# Patient Record
Sex: MALE | Race: WHITE | Employment: FULL TIME | URBAN - METROPOLITAN AREA
[De-identification: names, ages, dates, MRNs, and addresses within clinical notes are randomized per-mention and may not be internally consistent; named-entity substitution may affect disease eponyms.]

---

## 2017-03-30 ENCOUNTER — HOSPITAL ENCOUNTER (OUTPATIENT)
Dept: PREADMISSION TESTING | Age: 57
Discharge: HOME | End: 2017-03-30
Payer: COMMERCIAL

## 2017-03-30 VITALS
HEIGHT: 69 IN | BODY MASS INDEX: 27.99 KG/M2 | SYSTOLIC BLOOD PRESSURE: 127 MMHG | WEIGHT: 189 LBS | HEART RATE: 54 BPM | DIASTOLIC BLOOD PRESSURE: 71 MMHG | TEMPERATURE: 97.5 F

## 2017-03-30 LAB
ALBUMIN SERPL BCP-MCNC: 3.6 G/DL (ref 3.5–5)
ALBUMIN/GLOB SERPL: 1.1 {RATIO} (ref 1.1–2.2)
ALP SERPL-CCNC: 54 U/L (ref 45–117)
ALT SERPL-CCNC: 33 U/L (ref 12–78)
ANION GAP BLD CALC-SCNC: 10 MMOL/L (ref 5–15)
APPEARANCE UR: CLEAR
APTT PPP: 27.1 SEC (ref 22.1–32.5)
AST SERPL W P-5'-P-CCNC: 12 U/L (ref 15–37)
BACTERIA URNS QL MICRO: NEGATIVE /HPF
BASOPHILS # BLD AUTO: 0 K/UL (ref 0–0.1)
BASOPHILS # BLD: 1 % (ref 0–1)
BILIRUB SERPL-MCNC: 0.5 MG/DL (ref 0.2–1)
BILIRUB UR QL: NEGATIVE
BUN SERPL-MCNC: 16 MG/DL (ref 6–20)
BUN/CREAT SERPL: 14 (ref 12–20)
CALCIUM SERPL-MCNC: 9.3 MG/DL (ref 8.5–10.1)
CHLORIDE SERPL-SCNC: 103 MMOL/L (ref 97–108)
CO2 SERPL-SCNC: 27 MMOL/L (ref 21–32)
COLOR UR: ABNORMAL
CREAT SERPL-MCNC: 1.14 MG/DL (ref 0.7–1.3)
EOSINOPHIL # BLD: 0.2 K/UL (ref 0–0.4)
EOSINOPHIL NFR BLD: 2 % (ref 0–7)
EPITH CASTS URNS QL MICRO: ABNORMAL /LPF
ERYTHROCYTE [DISTWIDTH] IN BLOOD BY AUTOMATED COUNT: 13.4 % (ref 11.5–14.5)
GLOBULIN SER CALC-MCNC: 3.4 G/DL (ref 2–4)
GLUCOSE SERPL-MCNC: 129 MG/DL (ref 65–100)
GLUCOSE UR STRIP.AUTO-MCNC: >1000 MG/DL
HCT VFR BLD AUTO: 43.8 % (ref 36.6–50.3)
HGB BLD-MCNC: 14.9 G/DL (ref 12.1–17)
HGB UR QL STRIP: NEGATIVE
HYALINE CASTS URNS QL MICRO: ABNORMAL /LPF (ref 0–5)
INR PPP: 1 (ref 0.9–1.1)
KETONES UR QL STRIP.AUTO: NEGATIVE MG/DL
LEUKOCYTE ESTERASE UR QL STRIP.AUTO: NEGATIVE
LYMPHOCYTES # BLD AUTO: 30 % (ref 12–49)
LYMPHOCYTES # BLD: 1.9 K/UL (ref 0.8–3.5)
MCH RBC QN AUTO: 28.7 PG (ref 26–34)
MCHC RBC AUTO-ENTMCNC: 34 G/DL (ref 30–36.5)
MCV RBC AUTO: 84.4 FL (ref 80–99)
MONOCYTES # BLD: 0.5 K/UL (ref 0–1)
MONOCYTES NFR BLD AUTO: 8 % (ref 5–13)
NEUTS SEG # BLD: 3.8 K/UL (ref 1.8–8)
NEUTS SEG NFR BLD AUTO: 59 % (ref 32–75)
NITRITE UR QL STRIP.AUTO: NEGATIVE
PH UR STRIP: 5.5 [PH] (ref 5–8)
PLATELET # BLD AUTO: 239 K/UL (ref 150–400)
POTASSIUM SERPL-SCNC: 4.5 MMOL/L (ref 3.5–5.1)
PROT SERPL-MCNC: 7 G/DL (ref 6.4–8.2)
PROT UR STRIP-MCNC: NEGATIVE MG/DL
PROTHROMBIN TIME: 9.9 SEC (ref 9–11.1)
RBC # BLD AUTO: 5.19 M/UL (ref 4.1–5.7)
RBC #/AREA URNS HPF: ABNORMAL /HPF (ref 0–5)
SODIUM SERPL-SCNC: 140 MMOL/L (ref 136–145)
SP GR UR REFRACTOMETRY: 1.01 (ref 1–1.03)
THERAPEUTIC RANGE,PTTT: NORMAL SECS (ref 58–77)
UROBILINOGEN UR QL STRIP.AUTO: 0.2 EU/DL (ref 0.2–1)
WBC # BLD AUTO: 6.4 K/UL (ref 4.1–11.1)
WBC URNS QL MICRO: ABNORMAL /HPF (ref 0–4)

## 2017-03-30 PROCEDURE — 36415 COLL VENOUS BLD VENIPUNCTURE: CPT | Performed by: UROLOGY

## 2017-03-30 PROCEDURE — 85610 PROTHROMBIN TIME: CPT | Performed by: UROLOGY

## 2017-03-30 PROCEDURE — 87086 URINE CULTURE/COLONY COUNT: CPT | Performed by: UROLOGY

## 2017-03-30 PROCEDURE — 85025 COMPLETE CBC W/AUTO DIFF WBC: CPT | Performed by: UROLOGY

## 2017-03-30 PROCEDURE — 80053 COMPREHEN METABOLIC PANEL: CPT | Performed by: UROLOGY

## 2017-03-30 PROCEDURE — 81001 URINALYSIS AUTO W/SCOPE: CPT | Performed by: UROLOGY

## 2017-03-30 PROCEDURE — 85730 THROMBOPLASTIN TIME PARTIAL: CPT | Performed by: UROLOGY

## 2017-03-30 PROCEDURE — 93005 ELECTROCARDIOGRAM TRACING: CPT

## 2017-03-30 RX ORDER — DICLOFENAC SODIUM 75 MG/1
TABLET, DELAYED RELEASE ORAL
COMMUNITY

## 2017-03-30 NOTE — PERIOP NOTES
PATIENT  GIVEN PREOPERATIVE INSTRUCTION SHEET AND SURGICAL SITE INFECTION FAQS SHEET. PATIENT ALSO GIVEN CHG WIPES WITH WRITTEN INSTRUCTIONS FOR USE WHICH WERE REVIEWED WITH PATIENT.

## 2017-03-31 LAB
ATRIAL RATE: 53 BPM
CALCULATED P AXIS, ECG09: 11 DEGREES
CALCULATED R AXIS, ECG10: -50 DEGREES
CALCULATED T AXIS, ECG11: 19 DEGREES
DIAGNOSIS, 93000: NORMAL
P-R INTERVAL, ECG05: 132 MS
Q-T INTERVAL, ECG07: 402 MS
QRS DURATION, ECG06: 96 MS
QTC CALCULATION (BEZET), ECG08: 377 MS
VENTRICULAR RATE, ECG03: 53 BPM

## 2017-04-01 LAB
BACTERIA SPEC CULT: NORMAL
CC UR VC: NORMAL
SERVICE CMNT-IMP: NORMAL

## 2017-04-05 ENCOUNTER — ANESTHESIA EVENT (OUTPATIENT)
Dept: SURGERY | Age: 57
End: 2017-04-05
Payer: COMMERCIAL

## 2017-04-06 ENCOUNTER — HOSPITAL ENCOUNTER (OUTPATIENT)
Age: 57
Setting detail: OUTPATIENT SURGERY
Discharge: HOME | End: 2017-04-06
Attending: UROLOGY | Admitting: UROLOGY
Payer: COMMERCIAL

## 2017-04-06 ENCOUNTER — ANESTHESIA (OUTPATIENT)
Dept: SURGERY | Age: 57
End: 2017-04-06
Payer: COMMERCIAL

## 2017-04-06 VITALS
RESPIRATION RATE: 15 BRPM | DIASTOLIC BLOOD PRESSURE: 77 MMHG | WEIGHT: 189 LBS | HEART RATE: 61 BPM | HEIGHT: 69 IN | OXYGEN SATURATION: 97 % | BODY MASS INDEX: 27.99 KG/M2 | SYSTOLIC BLOOD PRESSURE: 128 MMHG | TEMPERATURE: 98.4 F

## 2017-04-06 LAB
GLUCOSE BLD STRIP.AUTO-MCNC: 133 MG/DL (ref 65–100)
GLUCOSE BLD STRIP.AUTO-MCNC: 168 MG/DL (ref 65–100)
SERVICE CMNT-IMP: ABNORMAL
SERVICE CMNT-IMP: ABNORMAL

## 2017-04-06 PROCEDURE — 74011000250 HC RX REV CODE- 250: Performed by: UROLOGY

## 2017-04-06 PROCEDURE — 77030002933 HC SUT MCRYL J&J -A: Performed by: UROLOGY

## 2017-04-06 PROCEDURE — 76010000153 HC OR TIME 1.5 TO 2 HR: Performed by: UROLOGY

## 2017-04-06 PROCEDURE — 74011000272 HC RX REV CODE- 272: Performed by: UROLOGY

## 2017-04-06 PROCEDURE — 74011250636 HC RX REV CODE- 250/636: Performed by: ANESTHESIOLOGY

## 2017-04-06 PROCEDURE — 77030002888 HC SUT CHRMC J&J -A: Performed by: UROLOGY

## 2017-04-06 PROCEDURE — 76060000034 HC ANESTHESIA 1.5 TO 2 HR: Performed by: UROLOGY

## 2017-04-06 PROCEDURE — 77030008684 HC TU ET CUF COVD -B: Performed by: ANESTHESIOLOGY

## 2017-04-06 PROCEDURE — 77030034849: Performed by: UROLOGY

## 2017-04-06 PROCEDURE — 77030005529 HC CATH URETH FOL40 BARD -A: Performed by: UROLOGY

## 2017-04-06 PROCEDURE — 74011250636 HC RX REV CODE- 250/636: Performed by: UROLOGY

## 2017-04-06 PROCEDURE — 77030031139 HC SUT VCRL2 J&J -A: Performed by: UROLOGY

## 2017-04-06 PROCEDURE — 77030010545: Performed by: UROLOGY

## 2017-04-06 PROCEDURE — 74011250636 HC RX REV CODE- 250/636

## 2017-04-06 PROCEDURE — 77030020782 HC GWN BAIR PAWS FLX 3M -B

## 2017-04-06 PROCEDURE — 76210000006 HC OR PH I REC 0.5 TO 1 HR: Performed by: UROLOGY

## 2017-04-06 PROCEDURE — 77030020256 HC SOL INJ NACL 0.9%  500ML: Performed by: UROLOGY

## 2017-04-06 PROCEDURE — 77030018846 HC SOL IRR STRL H20 ICUM -A: Performed by: UROLOGY

## 2017-04-06 PROCEDURE — 77030013079 HC BLNKT BAIR HGGR 3M -A: Performed by: ANESTHESIOLOGY

## 2017-04-06 PROCEDURE — 74011000250 HC RX REV CODE- 250

## 2017-04-06 PROCEDURE — 76210000021 HC REC RM PH II 0.5 TO 1 HR: Performed by: UROLOGY

## 2017-04-06 PROCEDURE — 77030026438 HC STYL ET INTUB CARD -A: Performed by: ANESTHESIOLOGY

## 2017-04-06 PROCEDURE — 77030032490 HC SLV COMPR SCD KNE COVD -B: Performed by: UROLOGY

## 2017-04-06 PROCEDURE — 77030011640 HC PAD GRND REM COVD -A: Performed by: UROLOGY

## 2017-04-06 PROCEDURE — 77030010507 HC ADH SKN DERMBND J&J -B: Performed by: UROLOGY

## 2017-04-06 PROCEDURE — 77030018836 HC SOL IRR NACL ICUM -A: Performed by: UROLOGY

## 2017-04-06 PROCEDURE — 77030012407 HC DRN WND BARD -B: Performed by: UROLOGY

## 2017-04-06 PROCEDURE — 77030010011 HC RNG RETRCTR STAY COOP -B: Performed by: UROLOGY

## 2017-04-06 PROCEDURE — 77030002986 HC SUT PROL J&J -A: Performed by: UROLOGY

## 2017-04-06 PROCEDURE — 82962 GLUCOSE BLOOD TEST: CPT

## 2017-04-06 PROCEDURE — 74011000258 HC RX REV CODE- 258: Performed by: UROLOGY

## 2017-04-06 RX ORDER — MIDAZOLAM HYDROCHLORIDE 1 MG/ML
INJECTION, SOLUTION INTRAMUSCULAR; INTRAVENOUS AS NEEDED
Status: DISCONTINUED | OUTPATIENT
Start: 2017-04-06 | End: 2017-04-06 | Stop reason: HOSPADM

## 2017-04-06 RX ORDER — VANCOMYCIN 1.75 GRAM/500 ML IN 0.9 % SODIUM CHLORIDE INTRAVENOUS
1750
Status: COMPLETED | OUTPATIENT
Start: 2017-04-06 | End: 2017-04-06

## 2017-04-06 RX ORDER — BUPIVACAINE HYDROCHLORIDE 5 MG/ML
INJECTION, SOLUTION EPIDURAL; INTRACAUDAL AS NEEDED
Status: DISCONTINUED | OUTPATIENT
Start: 2017-04-06 | End: 2017-04-06 | Stop reason: HOSPADM

## 2017-04-06 RX ORDER — HYDROCODONE BITARTRATE AND ACETAMINOPHEN 7.5; 325 MG/1; MG/1
TABLET ORAL
Qty: 25 TAB | Refills: 0 | Status: SHIPPED | OUTPATIENT
Start: 2017-04-06

## 2017-04-06 RX ORDER — GENTAMICIN SULFATE 80 MG/100ML
80 INJECTION, SOLUTION INTRAVENOUS
Status: DISCONTINUED | OUTPATIENT
Start: 2017-04-06 | End: 2017-04-06 | Stop reason: SDUPTHER

## 2017-04-06 RX ORDER — SODIUM CHLORIDE, SODIUM LACTATE, POTASSIUM CHLORIDE, CALCIUM CHLORIDE 600; 310; 30; 20 MG/100ML; MG/100ML; MG/100ML; MG/100ML
INJECTION, SOLUTION INTRAVENOUS
Status: DISCONTINUED | OUTPATIENT
Start: 2017-04-06 | End: 2017-04-06 | Stop reason: HOSPADM

## 2017-04-06 RX ORDER — SODIUM CHLORIDE 0.9 % (FLUSH) 0.9 %
5-10 SYRINGE (ML) INJECTION EVERY 8 HOURS
Status: DISCONTINUED | OUTPATIENT
Start: 2017-04-06 | End: 2017-04-06 | Stop reason: HOSPADM

## 2017-04-06 RX ORDER — PROPOFOL 10 MG/ML
INJECTION, EMULSION INTRAVENOUS AS NEEDED
Status: DISCONTINUED | OUTPATIENT
Start: 2017-04-06 | End: 2017-04-06 | Stop reason: HOSPADM

## 2017-04-06 RX ORDER — SODIUM CHLORIDE, SODIUM LACTATE, POTASSIUM CHLORIDE, CALCIUM CHLORIDE 600; 310; 30; 20 MG/100ML; MG/100ML; MG/100ML; MG/100ML
50 INJECTION, SOLUTION INTRAVENOUS CONTINUOUS
Status: DISCONTINUED | OUTPATIENT
Start: 2017-04-06 | End: 2017-04-06 | Stop reason: HOSPADM

## 2017-04-06 RX ORDER — SODIUM CHLORIDE 0.9 % (FLUSH) 0.9 %
5-10 SYRINGE (ML) INJECTION AS NEEDED
Status: DISCONTINUED | OUTPATIENT
Start: 2017-04-06 | End: 2017-04-06 | Stop reason: HOSPADM

## 2017-04-06 RX ORDER — ROCURONIUM BROMIDE 10 MG/ML
INJECTION, SOLUTION INTRAVENOUS AS NEEDED
Status: DISCONTINUED | OUTPATIENT
Start: 2017-04-06 | End: 2017-04-06 | Stop reason: HOSPADM

## 2017-04-06 RX ORDER — MORPHINE SULFATE 10 MG/ML
2 INJECTION, SOLUTION INTRAMUSCULAR; INTRAVENOUS
Status: DISCONTINUED | OUTPATIENT
Start: 2017-04-06 | End: 2017-04-06 | Stop reason: HOSPADM

## 2017-04-06 RX ORDER — LIDOCAINE HYDROCHLORIDE 20 MG/ML
INJECTION, SOLUTION EPIDURAL; INFILTRATION; INTRACAUDAL; PERINEURAL AS NEEDED
Status: DISCONTINUED | OUTPATIENT
Start: 2017-04-06 | End: 2017-04-06 | Stop reason: HOSPADM

## 2017-04-06 RX ORDER — ONDANSETRON 2 MG/ML
4 INJECTION INTRAMUSCULAR; INTRAVENOUS AS NEEDED
Status: DISCONTINUED | OUTPATIENT
Start: 2017-04-06 | End: 2017-04-06 | Stop reason: HOSPADM

## 2017-04-06 RX ORDER — SULFAMETHOXAZOLE AND TRIMETHOPRIM 800; 160 MG/1; MG/1
1 TABLET ORAL 2 TIMES DAILY
Qty: 14 TAB | Refills: 0 | Status: SHIPPED | OUTPATIENT
Start: 2017-04-06 | End: 2017-04-13

## 2017-04-06 RX ORDER — LIDOCAINE HYDROCHLORIDE 10 MG/ML
0.1 INJECTION, SOLUTION EPIDURAL; INFILTRATION; INTRACAUDAL; PERINEURAL AS NEEDED
Status: DISCONTINUED | OUTPATIENT
Start: 2017-04-06 | End: 2017-04-06 | Stop reason: HOSPADM

## 2017-04-06 RX ORDER — GLYCOPYRROLATE 0.2 MG/ML
INJECTION INTRAMUSCULAR; INTRAVENOUS AS NEEDED
Status: DISCONTINUED | OUTPATIENT
Start: 2017-04-06 | End: 2017-04-06

## 2017-04-06 RX ORDER — FENTANYL CITRATE 50 UG/ML
25 INJECTION, SOLUTION INTRAMUSCULAR; INTRAVENOUS
Status: DISCONTINUED | OUTPATIENT
Start: 2017-04-06 | End: 2017-04-06 | Stop reason: HOSPADM

## 2017-04-06 RX ORDER — HYDROMORPHONE HYDROCHLORIDE 1 MG/ML
0.2 INJECTION, SOLUTION INTRAMUSCULAR; INTRAVENOUS; SUBCUTANEOUS
Status: DISCONTINUED | OUTPATIENT
Start: 2017-04-06 | End: 2017-04-06 | Stop reason: HOSPADM

## 2017-04-06 RX ORDER — SUCCINYLCHOLINE CHLORIDE 20 MG/ML
INJECTION INTRAMUSCULAR; INTRAVENOUS AS NEEDED
Status: DISCONTINUED | OUTPATIENT
Start: 2017-04-06 | End: 2017-04-06 | Stop reason: HOSPADM

## 2017-04-06 RX ORDER — ONDANSETRON 2 MG/ML
INJECTION INTRAMUSCULAR; INTRAVENOUS AS NEEDED
Status: DISCONTINUED | OUTPATIENT
Start: 2017-04-06 | End: 2017-04-06 | Stop reason: HOSPADM

## 2017-04-06 RX ORDER — FENTANYL CITRATE 50 UG/ML
INJECTION, SOLUTION INTRAMUSCULAR; INTRAVENOUS AS NEEDED
Status: DISCONTINUED | OUTPATIENT
Start: 2017-04-06 | End: 2017-04-06 | Stop reason: HOSPADM

## 2017-04-06 RX ADMIN — PROPOFOL 170 MG: 10 INJECTION, EMULSION INTRAVENOUS at 10:15

## 2017-04-06 RX ADMIN — FENTANYL CITRATE 50 MCG: 50 INJECTION, SOLUTION INTRAMUSCULAR; INTRAVENOUS at 10:45

## 2017-04-06 RX ADMIN — GENTAMICIN SULFATE 380 MG: 40 INJECTION, SOLUTION INTRAMUSCULAR; INTRAVENOUS at 10:08

## 2017-04-06 RX ADMIN — ROCURONIUM BROMIDE 5 MG: 10 INJECTION, SOLUTION INTRAVENOUS at 10:15

## 2017-04-06 RX ADMIN — SUCCINYLCHOLINE CHLORIDE 180 MG: 20 INJECTION INTRAMUSCULAR; INTRAVENOUS at 10:15

## 2017-04-06 RX ADMIN — ROCURONIUM BROMIDE 25 MG: 10 INJECTION, SOLUTION INTRAVENOUS at 10:25

## 2017-04-06 RX ADMIN — SODIUM CHLORIDE, SODIUM LACTATE, POTASSIUM CHLORIDE, CALCIUM CHLORIDE: 600; 310; 30; 20 INJECTION, SOLUTION INTRAVENOUS at 10:00

## 2017-04-06 RX ADMIN — VANCOMYCIN HYDROCHLORIDE 1750 MG: 10 INJECTION, POWDER, LYOPHILIZED, FOR SOLUTION INTRAVENOUS at 09:36

## 2017-04-06 RX ADMIN — LIDOCAINE HYDROCHLORIDE 80 MG: 20 INJECTION, SOLUTION EPIDURAL; INFILTRATION; INTRACAUDAL; PERINEURAL at 10:15

## 2017-04-06 RX ADMIN — ONDANSETRON 4 MG: 2 INJECTION INTRAMUSCULAR; INTRAVENOUS at 10:08

## 2017-04-06 RX ADMIN — FENTANYL CITRATE 50 MCG: 50 INJECTION, SOLUTION INTRAMUSCULAR; INTRAVENOUS at 10:25

## 2017-04-06 RX ADMIN — FENTANYL CITRATE 50 MCG: 50 INJECTION, SOLUTION INTRAMUSCULAR; INTRAVENOUS at 10:05

## 2017-04-06 RX ADMIN — SODIUM CHLORIDE, SODIUM LACTATE, POTASSIUM CHLORIDE, AND CALCIUM CHLORIDE 50 ML/HR: 600; 310; 30; 20 INJECTION, SOLUTION INTRAVENOUS at 09:31

## 2017-04-06 RX ADMIN — MIDAZOLAM HYDROCHLORIDE 2 MG: 1 INJECTION, SOLUTION INTRAMUSCULAR; INTRAVENOUS at 10:05

## 2017-04-06 RX ADMIN — FENTANYL CITRATE 100 MCG: 50 INJECTION, SOLUTION INTRAMUSCULAR; INTRAVENOUS at 10:15

## 2017-04-06 NOTE — ANESTHESIA PREPROCEDURE EVALUATION
Anesthetic History   No history of anesthetic complications            Review of Systems / Medical History  Patient summary reviewed, nursing notes reviewed and pertinent labs reviewed    Pulmonary  Within defined limits                 Neuro/Psych              Cardiovascular                  Exercise tolerance: >4 METS     GI/Hepatic/Renal  Within defined limits              Endo/Other  Within defined limits  Diabetes: well controlled, type 2         Other Findings              Physical Exam    Airway  Mallampati: II  TM Distance: > 6 cm  Neck ROM: normal range of motion   Mouth opening: Normal     Cardiovascular    Rhythm: regular  Rate: normal         Dental  No notable dental hx       Pulmonary  Breath sounds clear to auscultation               Abdominal         Other Findings            Anesthetic Plan    ASA: 2  Anesthesia type: general          Induction: Intravenous  Anesthetic plan and risks discussed with: Patient

## 2017-04-06 NOTE — IP AVS SNAPSHOT
[address] [place] [address] 
[phone] Patient: Sergei Chavez MRN: [id number] RPB:4[birth year]7/[birth year]17 You are allergic to the following No active allergies Recent Documentation Height Weight BMI Smoking Status 1.753 m 85.7 kg 27.91 kg/m2 Never Smoker Unresulted Labs Order Current Status SAMPLE TO BLOOD BANK In process Emergency Contacts Name Discharge Info Relation Home Work Mobile [employer] SOHA DE LA O [facility] DISCHARGE CAREGIVER [3] Spouse [3] [phone] About your hospitalization You were admitted on:  April 6, 2017 You last received care in the:  [facility] PACU You were discharged on:  April 6, 2017 Unit phone number:  [phone] Why you were hospitalized Your primary diagnosis was:  Not on File Providers Seen During Your Hospitalizations Provider Role Specialty Primary office phone Catracho Cohen MD Attending Provider Urology [phone] Your Primary Care Physician (PCP) Primary Care Physician Office Phone Office Fax Aminata Grimm [phone] Follow-up Information Follow up With Details Comments Contact Info Estela Almeida MD   [facility] [address] [facility] [place] [address] 
[phone] Catracho Cohen MD Schedule an appointment as soon as possible for a visit in 10 day(s)  [facility] [address] [place] [address] 
[phone] Current Discharge Medication List  
  
START taking these medications Dose & Instructions Dispensing Information Comments Morning Noon Evening Bedtime HYDROcodone-acetaminophen 7.5-325 mg per tablet Commonly known as:  Vickie Ponce Your last dose was: Your next dose is:    
   
   
 1-2 tab PO C3oxehi PRN Pain Quantity:  25 Tab Refills:  0  
     
   
   
   
  
 trimethoprim-sulfamethoxazole 160-800 mg per tablet Commonly known as:  BACTRIM DS, SEPTRA DS Your last dose was: Your next dose is:    
   
   
 Dose:  1 Tab Take 1 Tab by mouth two (2) times a day for 7 days. Quantity:  14 Tab Refills:  0 CONTINUE these medications which have NOT CHANGED Dose & Instructions Dispensing Information Comments Morning Noon Evening Bedtime  
 diclofenac EC 75 mg EC tablet Commonly known as:  VOLTAREN Your last dose was: Your next dose is: Take  by mouth. Refills:  0 INVOKANA PO Your last dose was: Your next dose is:    
   
   
 Dose:  100 mg Take 100 mg by mouth daily. Refills:  0 KOMBIGLYZE XR PO Your last dose was: Your next dose is: Take  by mouth two (2) times a day. 2.5/1000MG Refills:  0 LIPITOR 20 mg tablet Generic drug:  atorvastatin Your last dose was: Your next dose is:    
   
   
 Dose:  20 mg Take 20 mg by mouth daily. Refills:  0  
     
   
   
   
  
 RAMIPRIL PO Your last dose was: Your next dose is:    
   
   
 Dose:  2.5 mg Take 2.5 mg by mouth daily. Refills:  0  
     
   
   
   
  
 TOUJEO SOLOSTAR SC Your last dose was: Your next dose is:    
   
   
 Dose:  57 Units 57 Units by SubCUTAneous route. 300/ UNITS/ ML Refills:  0 Where to Get Your Medications Information on where to get these meds will be given to you by the nurse or doctor. ! Ask your nurse or doctor about these medications HYDROcodone-acetaminophen 7.5-325 mg per tablet  
 trimethoprim-sulfamethoxazole 160-800 mg per tablet Discharge Instructions Post-Operative Instructions for Penile Prosthesis Diet There are no diet restrictions.   Recommend balanced meals that include lean meats, green and yellow vegetables, citrus fruits, dairy products and whole wheat bread and grains as these promote tissue healing. Medications Normally, you are sent home with two prescriptions. Be sure to get these filled promptly after your discharge from the hospital/surgery center. One is for an antibiotic. Take all of the antibiotics as prescribed. Call Dr. Kymberly Rubio if side effects arise. The other is for pain medication. Take when needed, according to prescription instructions. We recommend using Tylenol (acetaminophen) for pain first and use narcotic pain medication only as needed. A laxative is suggested to prevent constipation. Colace over-the-counter is effective. One tablet twice a day while using the narcotic prescription. Call our office if diarrhea or loose stools occur. All regularly prescribed medications may be resumed upon discharge, except blood thinners (ex: Coumadin, aspirin, ibuprofen). If you take blood thinner medication and were not advised about resuming this drug at discharge, please contact a nurse or physician before resuming. Wound Care Keep your incisions clean and dry. If your incision is on the underside of your penis, you should keep your penis up against your abdomen, held there by support underwear. Initially use ice or cold pack to prevent/treat swelling. No more than 20minutes at a time. Use a barrier (sheet/towel) to prevent tissue injury. Briefs or bicycle/exercise tights can help stabilize the area and decrease swelling. Contact us if: You notice excessive swelling or redness, red streaks, drainage from the incision, or extreme tenderness. You are unable to urinate, or if your temperature reaches 101.5F. You are having pain that is not being relieved by the medication prescribed for you. Swelling or warmth limited to one leg or arm.  
Call the office if between 8:00am and 4:30pm.  Call overnight line at [phone] from 4:30pm through 8:00am. 
 
Activity Refrain from driving for 3-5 days or while using narcotics. No heavy lifting (>15lbs) or strenuous exercise for 2 weeks. You may return to work at your discretion, usually after 1 to 2 weeks. No sexual activity until cleared by Dr. Duc Carlson. He will give you teaching on usage of the device during followup. Bathing Warm soaks may help for pain relief and tissue healing. They may be started 72 hours after surgery, two times daily for 15 to 20 minutes. You can shower in 48 hours but do not wash the wound site with soap. No baths or swimming for 2 weeks. [facility]  Contact Information  Daytime 8AM  4:30PM 
[employer] 
[phone] [place] 
[phone] After hours  [phone] DISCHARGE SUMMARY from Nurse The following personal items are in your possession at time of discharge: 
 
Dental Appliances: None Visual Aid: None Home Medications: None Jewelry: None Clothing: Other (comment) Other Valuables: None PATIENT INSTRUCTIONS: 
 
 
F-face looks uneven A-arms unable to move or move unevenly S-speech slurred or non-existent T-time-call 911 as soon as signs and symptoms begin-DO NOT go Back to bed or wait to see if you get better-TIME IS BRAIN. Warning Signs of HEART ATTACK Call 911 if you have these symptoms: 
? Chest discomfort. Most heart attacks involve discomfort in the center of the chest that lasts more than a few minutes, or that goes away and comes back. It can feel like uncomfortable pressure, squeezing, fullness, or pain. ? Discomfort in other areas of the upper body.  Symptoms can include pain or discomfort in one or both arms, the back, neck, jaw, or stomach. ? Shortness of breath with or without chest discomfort. ? Other signs may include breaking out in a cold sweat, nausea, or lightheadedness. Don't wait more than five minutes to call [address]! Fast action can save your life. Calling 911 is almost always the fastest way to get lifesaving treatment. Emergency Medical Services staff can begin treatment when they arrive  up to an hour sooner than if someone gets to the hospital by car. The discharge information has been reviewed with the patient & spouse. The patient & spouse verbalized understanding. Discharge medications reviewed with the patient and spouse and appropriate educational materials and side effects teaching were provided. Discharge Orders None Introducing [facility]! Vicki June introduces [facility] patient portal. Now you can access parts of your medical record, email your doctor's office, and request medication refills online. 1. In your internet browser, go to [email or web address] 2. Click on the First Time User? Click Here link in the Sign In box. You will see the New Member Sign Up page. 3. Enter your [facility] Access Code exactly as it appears below. You will not need to use this code after youve completed the sign-up process. If you do not sign up before the expiration date, you must request a new code. · [facility] Access Code: [email or web address] Expires: 6/26/2017  4:09 PM 
 
4. Enter the last four digits of your Social Security Number (xxxx) and Date of Birth (mm/dd/yyyy) as indicated and click Submit. You will be taken to the next sign-up page. 5. Create a [employer] ID. This will be your [facility] login ID and cannot be changed, so think of one that is secure and easy to remember. 6. Create a [employer] password. You can change your password at any time. 7. Enter your Password Reset Question and Answer. This can be used at a later time if you forget your password. 8. Enter your e-mail address. You will receive e-mail notification when new information is available in [address] 9. Click Sign Up. You can now view and download portions of your medical record. 10. Click the Download Summary menu link to download a portable copy of your medical information. If you have questions, please visit the Frequently Asked Questions section of the [employer] website. Remember, [employer] is NOT to be used for urgent needs. For medical emergencies, dial 911. Now available from your iPhone and Android! General Information Please provide this summary of care documentation to your next provider. Patient Signature:  ____________________________________________________________ Date:  ____________________________________________________________  
  
Berenice Virgen Provider Signature:  ____________________________________________________________ Date:  ____________________________________________________________

## 2017-04-06 NOTE — ANESTHESIA POSTPROCEDURE EVALUATION
Post-Anesthesia Evaluation and Assessment    Patient: Kishan Cosme MRN: [id number]  SSN: [id number]    YOB: 1960  Age: 65 y.o. Sex: male       Cardiovascular Function/Vital Signs  Visit Vitals    /77    Pulse 61    Temp 36.9 °C (98.4 °F)    Resp 15    Ht 5' 9\" (1.753 m)    Wt 85.7 kg (189 lb)    SpO2 97%    BMI 27.91 kg/m2       Patient is status post general anesthesia for Procedure(s):  REVISION  PENILE PROSTHESIS. Nausea/Vomiting: None    Postoperative hydration reviewed and adequate. Pain:  Pain Scale 1: Numeric (0 - 10) (04/06/17 1246)  Pain Intensity 1: 3 (04/06/17 1246)   Managed    Neurological Status:   Neuro (WDL): Exceptions to WDL (04/06/17 1246)  Neuro  Neurologic State: Alert;Drowsy (04/06/17 1246)  Orientation Level: Oriented X4 (04/06/17 1246)  Cognition: Follows commands (04/06/17 1246)  Speech: Clear (04/06/17 1246)  LUE Motor Response: Purposeful (04/06/17 1246)  LLE Motor Response: Purposeful (04/06/17 1246)  RUE Motor Response: Purposeful (04/06/17 1246)  RLE Motor Response: Purposeful (04/06/17 1246)   At baseline    Mental Status and Level of Consciousness: Arousable    Pulmonary Status:   O2 Device: Room air (04/06/17 1246)   Adequate oxygenation and airway patent    Complications related to anesthesia: None    Post-anesthesia assessment completed.  No concerns    Signed By: Shantal Child MD     April 6, 2017

## 2017-04-06 NOTE — H&P
Sarah Trejo is a 55year old male who presents today for \"about the same\". He returns for follow-up. Established patient who underwent three-piece IPP placement and vasectomy mid November 2016. Exploration and evaluation of left testicle as well as left nerve denervation performed December 2016. Update: The patient is doing a good bit better with the left testicular pain after denervation. He still has persistent adherence of the pump with difficulty. Denies fevers, chills, nausea, vomiting, or other signs of acute infection. PAST MEDICAL HISTORY:    Allergies: No known allergies. DENIES: Latex, Shellfish, X-Ray Dye, Iodine. Medications: TOUJEO SOLOSTAR 300 UNIT/ML SC SOPN (INSULIN GLARGINE)   KOMBIGLYZE XR 2.5-1000 MG XR24H-TAB (SAXAGLIPTIN-METFORMIN) daily  ATORVASTATIN CALCIUM 20 MG TABS (ATORVASTATIN CALCIUM) daily  RAMIPRIL 2.5 MG CAPS (RAMIPRIL) daily  INVOKANA 100 MG ORAL TABS (CANAGLIFLOZIN) daily    Problems: Pain in left testicle (ICD-608.9) (FNA38-F56.812)  Wound check (ICD-879.8) (HBG43-W79.89)  ELECTIVE STERILIZATION (ICD-V25.2) (MTL11-M02.2)  Erectile dysfunction, organic (ICD-607.84) (MSW52-B32.9)  ELECTIVE STERILIZATION (ICD-V25.2) (MDD61-M98.2)    Illnesses: Diabetes. DENIES: Heart Disease, Pacemaker/Defibrillator, Lung Disease, High Blood Pressure, Bowel Problems, Stroke/Seizure, Kidney Problems, Bleeding Problems, HIV, Hepatitis, or Cancer. Surgeries: Colon Surgery. Family History: DENIES: Prostate cancer, Kidney cancer, Kidney disease, Kidney stones. Social History: Full Time. . Smoking status: never smoker. Does not drink alcohol. System Review: DENIES: Unexplained Weight Loss, Dry Eyes, Dry Mouth, Leg Swelling, Shortness of Breath, Constipation, Involuntary Urine Loss, Lower Extremity Weakness, Dry Skin, Difficulty Walking, Psychiatric Problems, Impaired Sex Drive, Easy Bleeding, Rash.      Scrotum: without lesions   Testes: bilaterally non-tender, no masses   Epididymes: bilaterally no masses palpated, non-tender   Penis: pump adherent to left testis, but no signs of infection  Meatus: patent         URINALYSIS  Urine Dip not done  Urine Micro not done    IMPRESSION:    1. PAIN IN LEFT TESTICLE (ICD-608.9) - Resolved    2. ERECTILE DYSFUNCTION - ORGANIC (ICD-607.84) - Unchanged    PLAN:   1. Pain in left testicle. The patient is doing fairly well in that regard. I think any of the leftover mild pain he has is related to adherence of the pump to that side. 2.  Erectile dysfunction. The patient has persisting difficulties with the device, and I agree. Unfortunately, I am unable to easily deflate the device myself. We had a very candid discussion about his persisting difficulties, and I think that an exploration with possible revision is appropriate. I would recommend excision of capsule with separation from the left testicle. If that does not work, then I would recommend possible pump revision alone. If that does not work, he may need a full reimplant. He also may need a possible left orchiectomy. We had a very teetee discussion about my concerns and the possibility for recurring issues as well as infectious complications. I do believe that this would be most beneficial for this patient to help reduce the likelihood for persisting pathology, and he has great difficulty trying to use the device at this point. We will go forward, and he understands the risks, benefits, and alternatives. Transcribed by Valentin/mb.      cc: Stewart Powell MD  Transcribed by [employer]    [employer]  [facility]

## 2017-04-06 NOTE — ROUTINE PROCESS
Patient: Collins Harada MRN: [id number]  SSN: [id number]   YOB: 1960  Age: 65 y.o. Sex: male     Patient is status post Procedure(s):  REVISION 3 PIECE PENILE PROSTHESIS.     Surgeon(s) and Role:     * Rachana Wu MD - Primary    Irrigation: NACL                  Peripheral IV 04/06/17 Left Hand (Active)            Airway - Endotracheal Tube 04/06/17 Oral (Active)                   Dressing:  Wound Scrotum-DRESSING TYPE: Topical skin adhesive/glue (04/06/17 0900)

## 2017-04-06 NOTE — DISCHARGE INSTRUCTIONS
Post-Operative Instructions for Penile Prosthesis  Diet  There are no diet restrictions. Recommend balanced meals that include lean meats, green and yellow vegetables, citrus fruits, dairy products and whole wheat bread and grains as these promote tissue healing. Medications  Normally, you are sent home with two prescriptions. Be sure to get these filled promptly after your discharge from the hospital/surgery center. One is for an antibiotic. Take all of the antibiotics as prescribed. Call Dr. Joy Fisher if side effects arise. The other is for pain medication. Take when needed, according to prescription instructions. We recommend using Tylenol (acetaminophen) for pain first and use narcotic pain medication only as needed. A laxative is suggested to prevent constipation. Colace over-the-counter is effective. One tablet twice a day while using the narcotic prescription. Call our office if diarrhea or loose stools occur. All regularly prescribed medications may be resumed upon discharge, except blood thinners (ex: Coumadin, aspirin, ibuprofen). If you take blood thinner medication and were not advised about resuming this drug at discharge, please contact a nurse or physician before resuming. Wound Care  Keep your incisions clean and dry. If your incision is on the underside of your penis, you should keep your penis up against your abdomen, held there by support underwear. Initially use ice or cold pack to prevent/treat swelling. No more than 20minutes at a time. Use a barrier (sheet/towel) to prevent tissue injury. Briefs or bicycle/exercise tights can help stabilize the area and decrease swelling. Contact us if:  You notice excessive swelling or redness, red streaks, drainage from the incision, or extreme tenderness. You are unable to urinate, or if your temperature reaches 101.5F. You are having pain that is not being relieved by the medication prescribed for you.   Swelling or warmth limited to one leg or arm. Call the office if between 8:00am and 4:30pm.  Call overnight line at [phone] from 4:30pm through 8:00am.    Activity  Refrain from driving for 3-5 days or while using narcotics. No heavy lifting (>15lbs) or strenuous exercise for 2 weeks. You may return to work at your discretion, usually after 1 to 2 weeks. No sexual activity until cleared by Dr. Jayce Ferrer. He will give you teaching on usage of the device during followup. Bathing  Warm soaks may help for pain relief and tissue healing. They may be started 72 hours after surgery, two times daily for 15 to 20 minutes. You can shower in 48 hours but do not wash the wound site with soap. No baths or swimming for 2 weeks. [facility]  Contact Information  Daytime 8AM - 4:30PM  [employer]  [phone] [employer]  [phone]   After hours - [phone]        DISCHARGE SUMMARY from Nurse    The following personal items are in your possession at time of discharge:    Dental Appliances: None  Visual Aid: None     Home Medications: None  Jewelry: None  Clothing: Other (comment)  Other Valuables: None             PATIENT INSTRUCTIONS:    After general anesthesia or intravenous sedation, for 24 hours or while taking prescription Narcotics:  · Limit your activities  · Do not drive and operate hazardous machinery  · Do not make important personal or business decisions  · Do  not drink alcoholic beverages  · If you have not urinated within 8 hours after discharge, please contact your surgeon on call.     Report the following to your surgeon:  · Excessive pain, swelling, redness or odor of or around the surgical area  · Temperature over 100.5  · Nausea and vomiting lasting longer than 4 hours or if unable to take medications  · Any signs of decreased circulation or nerve impairment to extremity: change in color, persistent  numbness, tingling, coldness or increase pain  · Any questions        What to do at Home:  Recommended activity: as listed above    If you experience any of the following symptoms as listed above, please follow up with Dr Cat Holm. *  Please give a list of your current medications to your Primary Care Provider. *  Please update this list whenever your medications are discontinued, doses are      changed, or new medications (including over-the-counter products) are added. *  Please carry medication information at all times in case of emergency situations. These are general instructions for a healthy lifestyle:    No smoking/ No tobacco products/ Avoid exposure to second hand smoke    Surgeon General's Warning:  Quitting smoking now greatly reduces serious risk to your health. Obesity, smoking, and sedentary lifestyle greatly increases your risk for illness    A healthy diet, regular physical exercise & weight monitoring are important for maintaining a healthy lifestyle    You may be retaining fluid if you have a history of heart failure or if you experience any of the following symptoms:  Weight gain of 3 pounds or more overnight or 5 pounds in a week, increased swelling in our hands or feet or shortness of breath while lying flat in bed. Please call your doctor as soon as you notice any of these symptoms; do not wait until your next office visit. Recognize signs and symptoms of STROKE:    F-face looks uneven    A-arms unable to move or move unevenly    S-speech slurred or non-existent    T-time-call 911 as soon as signs and symptoms begin-DO NOT go       Back to bed or wait to see if you get better-TIME IS BRAIN. Warning Signs of HEART ATTACK     Call 911 if you have these symptoms:   Chest discomfort. Most heart attacks involve discomfort in the center of the chest that lasts more than a few minutes, or that goes away and comes back. It can feel like uncomfortable pressure, squeezing, fullness, or pain.    Discomfort in other areas of the upper body. Symptoms can include pain or discomfort in one or both arms, the back, neck, jaw, or stomach.  Shortness of breath with or without chest discomfort.  Other signs may include breaking out in a cold sweat, nausea, or lightheadedness. Don't wait more than five minutes to call 911 - MINUTES MATTER! Fast action can save your life. Calling 911 is almost always the fastest way to get lifesaving treatment. Emergency Medical Services staff can begin treatment when they arrive -- up to an hour sooner than if someone gets to the hospital by car. The discharge information has been reviewed with the patient & spouse. The patient & spouse verbalized understanding. Discharge medications reviewed with the patient and spouse and appropriate educational materials and side effects teaching were provided.

## 2017-04-06 NOTE — BRIEF OP NOTE
BRIEF OPERATIVE NOTE    Date of Procedure: 4/6/2017   Preoperative Diagnosis: ERECTILE DYSFUNCTION   Postoperative Diagnosis: ERECTILE DYSFUNCTION     Procedure(s):  REVISION  PENILE PROSTHESIS  Surgeon(s) and Role:     * Makenna Gómez MD - Primary            Surgical Staff:  Scrub Tech-1: Dnio Valentin  Scrub RN-1: Ender Bermudez RN  Surg Asst-1: Anselmo Pozo RN  Event Time In   Incision Start 1028   Incision Close 1143     Anesthesia: General   Estimated Blood Loss: <20mL  Specimens: * No specimens in log *   Findings:   Frozen pump capsule, adherence to left testis   Complications: None  Implants: * No implants in log *

## 2017-04-07 NOTE — OP NOTES
[address] [facility] [place] [address], [address]   OP NOTE       Name:  Manan Zambrano   MR#:  [id number]   :  1960   Account #:  [de-identified]    Surgery Date:  2017   Date of Adm:  2017       PREOPERATIVE DIAGNOSIS:  Erectile dysfunction with   malfunctioning penile prosthetic. POSTOPERATIVE DIAGNOSIS:  Erectile dysfunction with   malfunctioning penile prosthetic. PROCEDURES PERFORMED:  Revision of penile prosthesis by   scrotal exploration. ESTIMATED BLOOD LOSS: <20ml    SPECIMENS REMOVED: None. ANESTHESIA:  General.    FINDINGS: Pump adherent to the left testicle without infectious   etiology. Intact penile prosthesis mechanism. DRAINS: None. DESCRIPTION OF PROCEDURE: The patient was brought to the   operating room, placed in supine position. General anesthesia was   induced. The patient was prepped and draped in a standard fashion. Vancomycin and gentamicin were given preoperatively. Time-out   performed confirming the patient and procedure. At this time, a small   amount of local anesthetic was placed under the skin. I avoided   placing any further anesthetic to reduce risk for possible incidental   puncture of prosthetic device. Subsequent to that, the pump   mechanism was brought up into a subcutaneous position. A scalpel   was utilized for superficial skin incision, and then at cutting current with   30 cut with the Bovie, I then came down onto the pump device itself. There was a thickened capsule, otherwise no findings of pus or other   such pathology. A small amount of clear fluid surrounding the pump   itself. At this time, I delivered the pump and associated tubing out into   the surgical field. Throughout the procedure, copious amount of   bacitracin irrigant was utilized for dilutional technique. The capsule   itself was then dissected free and the entirety of the capsule   surrounding the pump.  I did use a Arellano catheter during the procedure   for localization, avoidance of injury to urethra. The pump capsule was   adherent to the left medial portion of the testicle. Careful dissection   allowed for removal of the capsule off the left epididymis and testicle. There was a clean plane. There was no evidence of tumor within the   testicle, tumor within the epididymis, infectious etiology, or other such   concern. Upon completion of resection of the capsule, I had a small   portion of the tunica vaginalis that had been removed. The epididymis   and testicle were intact with good blood flow and no evidence of   incidental vascular injury. I was able to reapproximate the tunica   vaginalis using a 3-0 chromic stitch. Once that was completed, I then   set 2 layers overlying the left testicle to allow it to stay separate from   the planned pump position. Once those 2 layers had been closed with   chromic suture, I then created a new pocket that would allow for   placement of the pump, secured it in position. Throughout the entirety   of this, I performed copious irrigation. Again, the prosthesis had cycled   well intraoperatively with insufflation, desufflation, and no evidence of   injury to the mechanism or pump prosthesis itself. Upon completion,   multilayer closure was then performed using Vicryl stitch. Copious   irrigation throughout, 4-0 Monocryl closure and Dermabond for skin   incision. the patient's catheter was removed. He was awakened and   brought to the PACU. We will plan a possible 1 day followup. Otherwise, 2-week followup. He will not use the device in the interim.         MD Betty Acharya / Lizzie Lara   D:  04/06/2017   16:45   T:  04/06/2017   22:23   Job #:  [id number]

## (undated) DEVICE — REM POLYHESIVE ADULT PATIENT RETURN ELECTRODE: Brand: VALLEYLAB

## (undated) DEVICE — SUTURE VCRL SZ 3-0 L27IN ABSRB UD L26MM SH 1/2 CIR J416H

## (undated) DEVICE — SPONGE: SPECIALTY PEANUT XR 100/CS: Brand: MEDICAL ACTION INDUSTRIES

## (undated) DEVICE — DRAPE,REIN 53X77,STERILE: Brand: MEDLINE

## (undated) DEVICE — SOLUTION IV 1000ML 0.9% SOD CHL

## (undated) DEVICE — SOLUTION IV 500ML 0.9% SOD CHL FLX CONT

## (undated) DEVICE — DERMABOND SKIN ADH 0.7ML -- DERMABOND ADVANCED 12/BX

## (undated) DEVICE — SUPPORT SCROT L FOR 39-44IN WAIST NYL CONSTANT COMFORTABLE

## (undated) DEVICE — DRAIN WND RND SILICONE 10FR

## (undated) DEVICE — GRADUATED BOWL: Brand: DEVON

## (undated) DEVICE — X-RAY SPONGES,16 PLY: Brand: DERMACEA

## (undated) DEVICE — INTENDED FOR TISSUE SEPARATION, AND OTHER PROCEDURES THAT REQUIRE A SHARP SURGICAL BLADE TO PUNCTURE OR CUT.: Brand: BARD-PARKER ® CARBON RIB-BACK BLADES

## (undated) DEVICE — TRAY PREP DRY W/ PREM GLV 2 APPL 6 SPNG 2 UNDPD 1 OVERWRAP

## (undated) DEVICE — STERILE POLYISOPRENE POWDER-FREE SURGICAL GLOVES WITH EMOLLIENT COATING: Brand: PROTEXIS

## (undated) DEVICE — TRAY CATH 16F DRN BG LTX -- CONVERT TO ITEM 363158

## (undated) DEVICE — BULB SYRINGE, IRRIGATION WITH PROTECTIVE CAP, 60 CC, INDIVIDUALLY WRAPPED: Brand: DOVER

## (undated) DEVICE — DEVON™ KNEE AND BODY STRAP 60" X 3" (1.5 M X 7.6 CM): Brand: DEVON

## (undated) DEVICE — SUTURE MCRYL SZ 4-0 L27IN ABSRB UD L19MM PS-2 1/2 CIR PRIM Y426H

## (undated) DEVICE — KENDALL SCD EXPRESS SLEEVES, KNEE LENGTH, MEDIUM: Brand: KENDALL SCD

## (undated) DEVICE — NEEDLE HYPO 25GA L1.5IN BVL ORIENTED ECLIPSE

## (undated) DEVICE — HOOK RETRCT L5MM E SHRP SELF RET SYS LONE STAR

## (undated) DEVICE — PREP SKN PREVAIL 40ML APPL --

## (undated) DEVICE — ROCKER SWITCH PENCIL BLADE ELECTRODE, HOLSTER: Brand: EDGE

## (undated) DEVICE — SURGICAL PROCEDURE PACK BASIN MAJ SET CUST NO CAUT

## (undated) DEVICE — BAG DRAIN URIN 2000ML LF STRL -- CONVERT TO ITEM 363123

## (undated) DEVICE — 1200 GUARD II KIT W/5MM TUBE W/O VAC TUBE: Brand: GUARDIAN

## (undated) DEVICE — DRAPE,U/ SHT,SPLIT,PLAS,STERIL: Brand: MEDLINE

## (undated) DEVICE — SUT PROL 3-0 36IN SH DA BLU --

## (undated) DEVICE — SUPER SPONGES,MEDIUM: Brand: DERMACEA

## (undated) DEVICE — KERLIX BANDAGE ROLL: Brand: KERLIX

## (undated) DEVICE — INFECTION CONTROL KIT SYS

## (undated) DEVICE — DBD-PACK,LAPAROTOMY,2 REINFORCED GOWNS: Brand: MEDLINE

## (undated) DEVICE — GOWN,ECLIPSE,POLYRNF,W/TWL,L,30/CS: Brand: MEDLINE

## (undated) DEVICE — SYR LR LCK 1ML GRAD NSAF 30ML --

## (undated) DEVICE — SYRINGE MED 20ML STD CLR PLAS LUERLOCK TIP N CTRL DISP

## (undated) DEVICE — TOWEL SURG W17XL27IN STD BLU COT NONFENESTRATED PREWASHED

## (undated) DEVICE — ROYAL SILK SURGICAL GOWN, XXL: Brand: CONVERTORS

## (undated) DEVICE — SOLUTION IRRIG 1000ML H2O STRL BLT

## (undated) DEVICE — (D)SYR 10ML 1/5ML GRAD NSAF -- PKGING CHANGE USE ITEM 338027

## (undated) DEVICE — SUT CHRMC 3-0 27IN SH BRN --

## (undated) DEVICE — CATH URETH FOL 2W MED 14FRX5 --

## (undated) DEVICE — NEEDLE HYPO 22GA L1.5IN BLK S STL HUB POLYPR SHLD REG BVL